# Patient Record
Sex: FEMALE | Race: BLACK OR AFRICAN AMERICAN
[De-identification: names, ages, dates, MRNs, and addresses within clinical notes are randomized per-mention and may not be internally consistent; named-entity substitution may affect disease eponyms.]

---

## 2019-03-16 ENCOUNTER — HOSPITAL ENCOUNTER (INPATIENT)
Dept: HOSPITAL 35 - ER | Age: 79
LOS: 2 days | Discharge: HOME | DRG: 69 | End: 2019-03-18
Attending: HOSPITALIST | Admitting: HOSPITALIST
Payer: COMMERCIAL

## 2019-03-16 VITALS — DIASTOLIC BLOOD PRESSURE: 75 MMHG | SYSTOLIC BLOOD PRESSURE: 126 MMHG

## 2019-03-16 VITALS — DIASTOLIC BLOOD PRESSURE: 64 MMHG | SYSTOLIC BLOOD PRESSURE: 165 MMHG

## 2019-03-16 VITALS — WEIGHT: 167.6 LBS | BODY MASS INDEX: 33.79 KG/M2 | HEIGHT: 59.02 IN

## 2019-03-16 VITALS — DIASTOLIC BLOOD PRESSURE: 36 MMHG | SYSTOLIC BLOOD PRESSURE: 155 MMHG

## 2019-03-16 DIAGNOSIS — G43.909: ICD-10-CM

## 2019-03-16 DIAGNOSIS — I10: ICD-10-CM

## 2019-03-16 DIAGNOSIS — Z82.49: ICD-10-CM

## 2019-03-16 DIAGNOSIS — Z80.8: ICD-10-CM

## 2019-03-16 DIAGNOSIS — Z83.3: ICD-10-CM

## 2019-03-16 DIAGNOSIS — Z83.6: ICD-10-CM

## 2019-03-16 DIAGNOSIS — E78.5: ICD-10-CM

## 2019-03-16 DIAGNOSIS — Z79.899: ICD-10-CM

## 2019-03-16 DIAGNOSIS — N17.9: ICD-10-CM

## 2019-03-16 DIAGNOSIS — M62.84: ICD-10-CM

## 2019-03-16 DIAGNOSIS — G45.9: Primary | ICD-10-CM

## 2019-03-16 DIAGNOSIS — E03.9: ICD-10-CM

## 2019-03-16 LAB
ALBUMIN SERPL-MCNC: 3.7 G/DL (ref 3.4–5)
ALT SERPL-CCNC: 12 U/L (ref 30–65)
ANION GAP SERPL CALC-SCNC: 7 MMOL/L (ref 7–16)
APTT BLD: 25.7 SECONDS (ref 24.5–32.8)
AST SERPL-CCNC: 16 U/L (ref 15–37)
BASOPHILS NFR BLD AUTO: 2 % (ref 0–2)
BILIRUB SERPL-MCNC: 0.3 MG/DL
BILIRUB UR-MCNC: NEGATIVE MG/DL
BUN SERPL-MCNC: 20 MG/DL (ref 7–18)
CALCIUM SERPL-MCNC: 9.1 MG/DL (ref 8.5–10.1)
CHLORIDE SERPL-SCNC: 100 MMOL/L (ref 98–107)
CO2 SERPL-SCNC: 26 MMOL/L (ref 21–32)
COLOR UR: YELLOW
CREAT SERPL-MCNC: 1.7 MG/DL (ref 0.6–1)
EOSINOPHIL NFR BLD: 2 % (ref 0–3)
ERYTHROCYTE [DISTWIDTH] IN BLOOD BY AUTOMATED COUNT: 14.5 % (ref 10.5–14.5)
GLUCOSE SERPL-MCNC: 98 MG/DL (ref 74–106)
GRANULOCYTES NFR BLD MANUAL: 49.7 % (ref 36–66)
HCT VFR BLD CALC: 30.2 % (ref 37–47)
HGB BLD-MCNC: 9.8 GM/DL (ref 12–15)
INR PPP: 1
KETONES UR STRIP-MCNC: NEGATIVE MG/DL
LYMPHOCYTES NFR BLD AUTO: 37.4 % (ref 24–44)
MCH RBC QN AUTO: 26.9 PG (ref 26–34)
MCHC RBC AUTO-ENTMCNC: 32.5 G/DL (ref 28–37)
MCV RBC: 82.9 FL (ref 80–100)
MONOCYTES NFR BLD: 8.9 % (ref 1–8)
NEUTROPHILS # BLD: 2.6 THOU/UL (ref 1.4–8.2)
NITRITE UR QL STRIP: NEGATIVE
PLATELET # BLD: 227 THOU/UL (ref 150–400)
POTASSIUM SERPL-SCNC: 4.8 MMOL/L (ref 3.5–5.1)
PROT SERPL-MCNC: 7.9 G/DL (ref 6.4–8.2)
PROTHROMBIN TIME: 10.9 SECONDS (ref 9.3–11.4)
RBC # BLD AUTO: 3.64 MIL/UL (ref 4.2–5)
RBC # UR STRIP: (no result) /UL
SODIUM SERPL-SCNC: 133 MMOL/L (ref 136–145)
SP GR UR STRIP: 1.01 (ref 1–1.03)
TROPONIN I SERPL-MCNC: <0.06 NG/ML (ref ?–0.06)
URINE CLARITY: CLEAR
URINE GLUCOSE-RANDOM*: NEGATIVE
URINE LEUKOCYTES: NEGATIVE
URINE PROTEIN (DIPSTICK): NEGATIVE
UROBILINOGEN UR STRIP-ACNC: 0.2 E.U./DL (ref 0.2–1)
WBC # BLD AUTO: 5.2 THOU/UL (ref 4–11)

## 2019-03-16 PROCEDURE — 10779: CPT

## 2019-03-16 PROCEDURE — 10879: CPT

## 2019-03-17 VITALS — SYSTOLIC BLOOD PRESSURE: 137 MMHG | DIASTOLIC BLOOD PRESSURE: 52 MMHG

## 2019-03-17 VITALS — SYSTOLIC BLOOD PRESSURE: 139 MMHG | DIASTOLIC BLOOD PRESSURE: 55 MMHG

## 2019-03-17 VITALS — SYSTOLIC BLOOD PRESSURE: 130 MMHG | DIASTOLIC BLOOD PRESSURE: 61 MMHG

## 2019-03-17 VITALS — DIASTOLIC BLOOD PRESSURE: 49 MMHG | SYSTOLIC BLOOD PRESSURE: 127 MMHG

## 2019-03-17 VITALS — DIASTOLIC BLOOD PRESSURE: 39 MMHG | SYSTOLIC BLOOD PRESSURE: 104 MMHG

## 2019-03-17 LAB
ANION GAP SERPL CALC-SCNC: 9 MMOL/L (ref 7–16)
BUN SERPL-MCNC: 17 MG/DL (ref 7–18)
CALCIUM SERPL-MCNC: 8.9 MG/DL (ref 8.5–10.1)
CHLORIDE SERPL-SCNC: 99 MMOL/L (ref 98–107)
CHOLEST SERPL-MCNC: 138 MG/DL (ref ?–200)
CO2 SERPL-SCNC: 25 MMOL/L (ref 21–32)
CREAT SERPL-MCNC: 1.6 MG/DL (ref 0.6–1)
ERYTHROCYTE [DISTWIDTH] IN BLOOD BY AUTOMATED COUNT: 14.8 % (ref 10.5–14.5)
EST. AVERAGE GLUCOSE BLD GHB EST-MCNC: 123 MG/DL
GLUCOSE SERPL-MCNC: 89 MG/DL (ref 74–106)
GLYCOHEMOGLOBIN (HGB A1C): 5.9 % (ref 4.8–5.6)
HCT VFR BLD CALC: 30.8 % (ref 37–47)
HDLC SERPL-MCNC: 50 MG/DL (ref 40–?)
HGB BLD-MCNC: 10 GM/DL (ref 12–15)
LDLC SERPL-MCNC: 69 MG/DL (ref ?–100)
MCH RBC QN AUTO: 27 PG (ref 26–34)
MCHC RBC AUTO-ENTMCNC: 32.6 G/DL (ref 28–37)
MCV RBC: 82.9 FL (ref 80–100)
PLATELET # BLD: 220 THOU/UL (ref 150–400)
POTASSIUM SERPL-SCNC: 4.4 MMOL/L (ref 3.5–5.1)
RBC # BLD AUTO: 3.71 MIL/UL (ref 4.2–5)
SODIUM SERPL-SCNC: 133 MMOL/L (ref 136–145)
TC:HDL: 2.8 RATIO
TRIGL SERPL-MCNC: 98 MG/DL (ref ?–150)
VLDLC SERPL CALC-MCNC: 20 MG/DL (ref ?–40)
WBC # BLD AUTO: 6.3 THOU/UL (ref 4–11)

## 2019-03-17 NOTE — NUR
ASSUMED CARE OF PT AT 0700. ASSESSMENT COMPLETED. A&0,X4. ROOM AIR. SR/SB. NEW
HOME MEDS RESTARTED AS ORDERED. PT COMPLAINING OF LEG PAIN, ARTERIAL US
COMPLETED, NO STENOSIS NOTED. DR. GREEN WAS AT BEDSIDE THIS AFTERNOON, ORDERS
FOR MRI AND MRA - NO ACUTE PROCESS NOTED. PT IN STABLE CONDITION. NO OTHER
CHANGE IN STATUS.

## 2019-03-17 NOTE — NUR
ASSUMED CARE OF PATIENT AT 2300. PATIENT ARRIVED TO ROOM 354 VIA WHEELCHAIR
WITH THIS RN FROM ER. [facility] SCORE OF 1 D/T PATIENT STATING AGE OF 76 WHEN ACTUAL
AGE IS 78. DISCUSSED WITH PATIENT AFTER [facility] SCORING THAT PATIENT IS IN FACT 78
AND NOT 76, PATIENT ABLE TO TALK THROUGH YEAR SHE WAS BORN AND HOW MANY YEARS
SINCE TO DETERMINE CORRECT/CURRENT AGE. PATIENT INTACT OTHERWISE. C/O HEADACHE
AND L LOWER EXTREMITY PAIN, TYLENOL ADMINISTERED, PATIENT ABLE TO SLEEP.
PATIENT ABLE TO SLEEP MOST OF SHIFT. NO OTHER CONCERNS/COMPLAINTS AT THIS
TIME. WILL CONTINUE TO MONITOR.

## 2019-03-18 VITALS — DIASTOLIC BLOOD PRESSURE: 48 MMHG | SYSTOLIC BLOOD PRESSURE: 127 MMHG

## 2019-03-18 VITALS — SYSTOLIC BLOOD PRESSURE: 127 MMHG | DIASTOLIC BLOOD PRESSURE: 48 MMHG

## 2019-03-18 VITALS — SYSTOLIC BLOOD PRESSURE: 138 MMHG | DIASTOLIC BLOOD PRESSURE: 59 MMHG

## 2019-03-18 VITALS — DIASTOLIC BLOOD PRESSURE: 69 MMHG | SYSTOLIC BLOOD PRESSURE: 133 MMHG

## 2019-03-18 LAB
ANION GAP SERPL CALC-SCNC: 10 MMOL/L (ref 7–16)
BUN SERPL-MCNC: 15 MG/DL (ref 7–18)
CALCIUM SERPL-MCNC: 8.6 MG/DL (ref 8.5–10.1)
CHLORIDE SERPL-SCNC: 104 MMOL/L (ref 98–107)
CO2 SERPL-SCNC: 23 MMOL/L (ref 21–32)
CREAT SERPL-MCNC: 1.2 MG/DL (ref 0.6–1)
ERYTHROCYTE [DISTWIDTH] IN BLOOD BY AUTOMATED COUNT: 14.6 % (ref 10.5–14.5)
GLUCOSE SERPL-MCNC: 84 MG/DL (ref 74–106)
HCT VFR BLD CALC: 27.8 % (ref 37–47)
HGB BLD-MCNC: 9 GM/DL (ref 12–15)
MCH RBC QN AUTO: 26.8 PG (ref 26–34)
MCHC RBC AUTO-ENTMCNC: 32.2 G/DL (ref 28–37)
MCV RBC: 83.3 FL (ref 80–100)
PLATELET # BLD: 201 THOU/UL (ref 150–400)
POTASSIUM SERPL-SCNC: 5.2 MMOL/L (ref 3.5–5.1)
RBC # BLD AUTO: 3.34 MIL/UL (ref 4.2–5)
SODIUM SERPL-SCNC: 137 MMOL/L (ref 136–145)
WBC # BLD AUTO: 5.8 THOU/UL (ref 4–11)

## 2019-03-18 NOTE — NUR
FOLLOWING PT POC WITH IVF.  PT IS A/OX4 AND VSS.  PT IS UP TO BATHROOM WITH IV
POLE IN TOW.  PT HAS NO COMPLAINTS OF N/V OR PAIN.  HOURLY ROUNDING.

## 2021-04-21 ENCOUNTER — HOSPITAL ENCOUNTER (EMERGENCY)
Dept: HOSPITAL 35 - ER | Age: 81
Discharge: HOME | End: 2021-04-21
Payer: COMMERCIAL

## 2021-04-21 VITALS — WEIGHT: 120 LBS | HEIGHT: 60 IN | BODY MASS INDEX: 23.56 KG/M2

## 2021-04-21 VITALS — DIASTOLIC BLOOD PRESSURE: 54 MMHG | SYSTOLIC BLOOD PRESSURE: 133 MMHG

## 2021-04-21 DIAGNOSIS — Z79.82: ICD-10-CM

## 2021-04-21 DIAGNOSIS — M54.2: Primary | ICD-10-CM

## 2021-04-21 DIAGNOSIS — Z86.73: ICD-10-CM
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
## 2021-04-21 DIAGNOSIS — G43.909: ICD-10-CM

## 2021-04-21 DIAGNOSIS — M54.5: ICD-10-CM

## 2021-04-21 DIAGNOSIS — I10: ICD-10-CM

## 2021-04-21 DIAGNOSIS — Z87.891: ICD-10-CM

## 2021-04-21 DIAGNOSIS — Z79.899: ICD-10-CM

## 2021-04-21 DIAGNOSIS — E03.9: ICD-10-CM
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
## 2021-04-21 DIAGNOSIS — M79.605: ICD-10-CM
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
## 2021-04-21 DIAGNOSIS — E78.5: ICD-10-CM
